# Patient Record
(demographics unavailable — no encounter records)

---

## 2017-03-28 NOTE — DIAGNOSTIC IMAGING REPORT
Indications: Lung nodule, followup



Technique: Continuous helical CT imaging of the thorax and upper abdomen was

performed with automatic exposure control on a Siemens sensation 64 multidetector 

CT

scanner. Axial images were reconstructed at 5 mm slice thickness and interval.

Coronal images were reconstructed at 5 mm slice thickness. No IV contrast 

was

administered secondary to patient's order, no contraindications listed.



CTDI volume(s):  19 mGy

Total DLP:  613 mGy-cm



Findings:



Comparison: None. To date, no prior outside examinations haven't been made available

for comparison.



Circumscribed, noncalcified soft tissue nodule is present in the lateral periphery

of the lateral segment of the middle lobe of the right lung, 9 mm maximum 

diameter.

Pleural-based irregularly increased interstitial markings, focal linear and 

patchy

consolidative opacities are present in the adjacent lung parenchyma. Similar 

less

prominent parenchymal densities are present in the basal aspect lingula. 

Additional

irregular pleural-based linear densities are present in both lung apices and lower

lobes. No significant pleural abnormality demonstrated. Heart normal size. 

Scattered

arterial mural calcifications. Vascular patency indeterminate. Thoracic 

aorta

nonaneurysmal. No obvious pericardial abnormality. No obvious mediastinal or 

hilar

enlarged lymph nodes, other abnormal mass or fluid collection. Chest wall soft

tissues nonfocal. Imaged upper abdominal anatomy unremarkable. Multilevel disc 

space

narrowing with marginal osteophyte formation, vacuum phenomenon and thoracic spine.



IMPRESSION:



9 mm nodule right middle lobe--inflammatory versus neoplastic. No prior 

outside

examinations available for comparison, so stability indeterminate. Should prior

outside examination made available for comparison, an addendum can be dictated at

that time.



Pulmonary biapical and bibasal subsegmental atelectasis versus scarring



Arteriosclerosis



Degenerative spondylosis

## 2017-03-30 NOTE — DIAGNOSTIC IMAGING REPORT
Indication: Palpable left breast lump felt by physician.



Technique: Craniocaudal and mediolateral oblique views. Left breast spot 

compression

views. Focused ultrasound left breast



Comparison: Outside digital images dated 3/29/2016



Findings: The patient was unsure where physician felt a lump, questionably in the

left lower outer quadrant. Spot compression views of this area were obtained. The

breasts are heterogeneously dense, which may obscure small masses. No 

parenchymal

asymmetry nor architectural distortion. No dominant masses nor suspicious 

clustered

microcalcifications. There are benign calcifications bilaterally. No skin thickening

nor nipple retraction. Ultrasound demonstrates no cystic or solid abnormality. 

Spot

compression views reveal no evidence of mass lesion. There is no significant 

interim

change from the prior mammogram



Impression: No mammographic evidence of malignancy. Specifically, no mammographic 

or

sonographic abnormalities to correspond to the reported palpable abnormality.

Recommend routine interval mammographic followup



Note, however, that negative mammographic and sonographic findings do not completely

rule out malignancy in the setting of a suspicious palpable abnormality, and

clinical findings should take precedence



BI-RADS category 2-benign



Breast density BI-RADS type C

## 2017-11-14 NOTE — DIAGNOSTIC IMAGING REPORT
Clinical Indication: Cough, history right lower lobe nodule on prior CT scan,

needing short interval followup



Technique: Spiral acquisitions obtained through the chest. No IV contrast utilized,

per referring physician request. Multiplanar reconstructions generated. Total dose

length product 627 mGycm. CTDIvol(s) 18 mGy. Dose reduction achieved using 

automated

exposure control





Comparison: 3/27/2017



Findings:Right peripheral middle lobe nodule is stable or perhaps slightly decreased

in size, currently measuring 7 mm maximal dimension, previously 8-9 mm.



A tiny cystic space is seen in the posterior right middle lobe. Some reticular

opacities are seen in the anterior inferior peripheral right middle lobe. 

Linear

interstitial opacities and scarring are again demonstrated at the left lung base. 

No

other nodules or masses. No infiltrates, effusions, or congestion demonstrated. 

Some

parenchymal scarring is seen at the lung apices bilaterally.



The heart size is normal. No pericardial effusion. There are coronary 

artery

calcifications. Mildly ectatic ascending thoracic aorta, 4 cm in diameter. No

mediastinal hilar mass or adenopathy. The included thyroid is unremarkable. No

axillary or chest wall mass or adenopathy.



Included upper abdominal anatomy demonstrate cholecystectomy clips. The pancreas 

is

somewhat atrophic. There is questionably a small calcified splenic hilar aneurysm.



There are degenerative changes of the thoracic spine.



Impression: 7 mm diameter right middle lobe nodule, stable or perhaps slightly

smaller than on previous study of 3/27/2017. Lack of interim growth suggests but

does not confirm benignity. If patient is at low risk for lung carcinoma, followup

CT in 18-24 months be considered. If patient is at high risk for lung carcinoma,

followup CT in 18-24 months is strongly recommended, per the Fleischner Society

criteria



Tiny right lung cystic space again demonstrated.



Other stable findings as described.



Incidental findings as noted, including evidence of prior cholecystectomy,

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

spondylosis



The CT scanner at [facility] is accredited by the [employer] College 

of

Radiology and the scans are performed using protocols designed to limit 

radiation

exposure to as low as reasonably achievable to attain images of sufficient

resolution adequate for diagnostic evaluation.

## 2022-01-18 NOTE — EMERGENCY ROOM REPORT
History of Present Illness


General


Chief Complaint:  Lower Extremity Injury


Source:  Patient





Present Illness


HPI


83YOF with 2 days of area of redness and warmth to anterior left shin.  

Possibly scraped it on something vs insect bites outside yesterday.  Denies 

fever/chills, calf pain, swelling.  Had cellulitis before.  Denies self or 

family history of DVT/PE.  Denies DM history.  Feels well otherwise.


Allergies:  


Coded Allergies:  


     CODEINE (Verified  Allergy, Unknown, 5/29/17)





Patient History


Past Medical History:  see triage record, old chart reviewed


Past Surgical History:  none


Pertinent Family History:  none


Social History:  Denies: alcohol use, drug use, smoking


Last Menstrual Period:  NONE


Pregnant Now:  No


Immunizations:  UTD


Reviewed Nursing Documentation:  PMH: Agreed, PSxH: Agreed





Review of Systems


All Other Systems:  negative except mentioned in HPI





Physical Exam





Vital Signs








  Date Time  Temp Pulse Resp B/P Pulse Ox O2 Delivery O2 Flow Rate FiO2


 


5/29/17 21:50 98.2 72 18 149/79 99 Room Air  








Sp02 EP Interpretation:  reviewed, normal


General Appearance:  normal inspection, well appearing, no apparent distress, 

alert, GCS 15, non-toxic


Head:  normocephalic, atraumatic


Eyes:  bilateral eye EOMI, bilateral eye PERRL


ENT:  normal ENT inspection, hearing grossly normal, normal voice


Neck:  normal inspection, full range of motion, supple, no bony tend


Respiratory:  normal inspection, lungs clear, normal breath sounds, no 

respiratory distress, no retraction, no wheezing


Cardiovascular #1:  regular rate, rhythm, no edema


Gastrointestinal:  normal inspection, normal bowel sounds, non tender, soft, no 

guarding, no hernia


Genitourinary:  no CVA tenderness


Musculoskeletal:  normal inspection, back normal, normal range of motion, Dora'

s Sign negative


Neurologic:  normal inspection, alert, oriented x3, responsive, CNs III-XII nml 

as tested, speech normal


Psychiatric:  normal inspection, judgement/insight normal, mood/affect normal


Skin:  normal inspection, other - Left anterior shin: 4cm area of erythema, 

warmth.  No calf ttp





Medical Decision Making


Diagnostic Impression:  


 Primary Impression:  


 Cellulitis


 Qualified Codes:  L03.116 - Cellulitis of left lower limb


ER Course


Left anterior shin cellulitis


No abscess


VSS. Afebrile.


No systemic signs or symptoms


Rx Keflex, initial dose given in ED


No comordbidities warranting additional Abx


DC home





Last Vital Signs








  Date Time  Temp Pulse Resp B/P Pulse Ox O2 Delivery O2 Flow Rate FiO2


 


5/29/17 21:50 98.2 72 18 149/79 99 Room Air  








Status:  improved


Disposition:  HOME, SELF-CARE


Condition:  Improved


Scripts


Cephalexin* (KEFLEX*) 500 Mg Capsule


500 MG ORAL Q6H for 7 Days, #27 CAP 0 Refills


   Prov: ZHANE ALBRIGHT M.D.         5/29/17


Referrals:  


NON PHYSICIAN (PCP)


Patient Instructions:  Cellulitis, Easy-to-Read





Additional Instructions:  


- Take ALL antibiotics - Keflex - until finished


- Follow up with your primary care doctor in 1 week











ZHANE ALBRIGHT M.D. May 29, 2017 23:04
Detail Level: Zone
Render In Strict Bullet Format?: No
Plan: Discussed to use hydrocortisone 2.5% mix it with vasolin to use around the eyes.
Initiate Treatment: Hydrocortisone 2.5% cream mix with vasolin